# Patient Record
Sex: MALE | Race: AMERICAN INDIAN OR ALASKA NATIVE | NOT HISPANIC OR LATINO | Employment: UNEMPLOYED | URBAN - METROPOLITAN AREA
[De-identification: names, ages, dates, MRNs, and addresses within clinical notes are randomized per-mention and may not be internally consistent; named-entity substitution may affect disease eponyms.]

---

## 2021-09-09 ENCOUNTER — TRANSFERRED RECORDS (OUTPATIENT)
Dept: HEALTH INFORMATION MANAGEMENT | Facility: CLINIC | Age: 46
End: 2021-09-09

## 2021-09-09 ENCOUNTER — MEDICAL CORRESPONDENCE (OUTPATIENT)
Dept: HEALTH INFORMATION MANAGEMENT | Facility: CLINIC | Age: 46
End: 2021-09-09

## 2021-09-30 ENCOUNTER — MEDICAL CORRESPONDENCE (OUTPATIENT)
Dept: HEALTH INFORMATION MANAGEMENT | Facility: CLINIC | Age: 46
End: 2021-09-30

## 2021-09-30 ENCOUNTER — TRANSFERRED RECORDS (OUTPATIENT)
Dept: HEALTH INFORMATION MANAGEMENT | Facility: CLINIC | Age: 46
End: 2021-09-30

## 2021-10-19 ENCOUNTER — REFERRAL (OUTPATIENT)
Dept: TRANSPLANT | Facility: CLINIC | Age: 46
End: 2021-10-19

## 2021-10-19 DIAGNOSIS — K70.31 ALCOHOLIC CIRRHOSIS OF LIVER WITH ASCITES (H): ICD-10-CM

## 2021-10-19 DIAGNOSIS — K70.30 ALCOHOLIC CIRRHOSIS (H): Primary | ICD-10-CM

## 2021-10-19 NOTE — LETTER
10/21/2021    Andrei Long  [address]  [place] [address]      Dear Andrei,    Thank you for your interest in the Transplant Center at [facility]. We look forward to being a part of your care team and assisting you through the transplant process.    As we discussed, your transplant coordinator is Hanh Vences (Liver).  You may call your coordinator at any time with questions or concerns call [phone].    Please complete the following.    1. Fill out and return the enclosed forms    Authorization for Electronic Communication    Authorization to Discuss Protected Health Information    Authorization for Release of Protected Health Information    Authorization for Care Everywhere Release of Information    2. Sign up for:    [employer], access to your electronic medical record (see enclosed pamphlet)    [email or web address], a transplant education website    You can use these tools to learn more about your transplant, communicate with your care team, and track your medical details      Sincerely,  Solid Organ Transplant  [facility]    cc: Referring Physician and PCP

## 2021-10-21 VITALS — WEIGHT: 197 LBS | BODY MASS INDEX: 28.2 KG/M2 | HEIGHT: 70 IN

## 2021-10-21 ASSESSMENT — MIFFLIN-ST. JEOR: SCORE: 1784.84

## 2021-10-21 NOTE — TELEPHONE ENCOUNTER
Patient was asked the following questions during liver intake call.     Referring Provider: Kimi Georges PA-C  Referring Diagnosis: Alcoholic Cirrhosis of the Liver  PCP: Rachael Gibson PA-C    1)Do you know why you have liver disease: Yes       If Alcoholic Cirrhosis is present when was your last drink: 8/2021       Have you ever been through treatment for alcohol: Yes  2) Presence of Ascites: Yes Paracentesis: Yes  3) Presence of Hepatic Encephalopathy: Yes Medications: Yes  4) History of GI Bleeding: Yes  5) Oxygen Use: None  6) EGD: Yes Where: [facility]  When: 2021  7) Colonoscopy: No   8) MELD Score: 20  9) Labs available for review from PCP/GI: Yes  10) HCC Diagnosis: No  11)Insurance information: Medicaid      Policy leon: Self      Subscriber/policy/ID number: [id number]      Group Number:     Referral intake process completed.  Patient is aware that after financial approval is received, medical records will be requested.   Patient confirmed for a callback from transplant coordinator on 10/28/2021.  Tentative evaluation date TBD.    Confirmed coordinator will discuss evaluation process in more detail at the time of their call.   Patient is aware of the need to arrange age appropriate cancer screening, vaccinations, and dental care.  Reminded patient to complete questionnaire, complete medical records release, and review packet prior to evaluation visit .  Assessed patient for special needs (ie--wheelchair, assistance, guardian, and ):  None   Patient instructed to call [phone] with questions.     Patient gave verbal consent during intake call to obtain medical records and documents outside of [facility]:  Yes     CHIQUI Pierson, LPN       Solid Organ Transplant

## 2021-10-28 NOTE — TELEPHONE ENCOUNTER
"LIVER DISEASE ETOH  REFERRING PROVIDER [facility]  -----------------------------------------------------------------------------------------------------------------------------  MELD 20 on 21    Patient has follow up appt on  with local GI and will be getting updated labs    HISTORY OF LIVER DISEASE  First diagnosed over 8 years ago with liver disease- ETOH cirrhosis.  Wife passed away 8 years ago and he began drinking more and more heavily and symptoms progressed including nausea, muscle wasting, vomiting, and ascites;  Has had three para/thoras recently, improved over last month with sobriety period and diuretics    New mass found on CT- has appt with local GI to discuss follow up on this as well as general follow up;  Has no repeat imaging ordered as of this time;  7.8 cm lesion on CT      Ascites  Gore Furosemide- on both  Efren- per above;  Nothing in last month  TIPS no  HE not on lactulose or rifaximin, reports some fogginess  Kidney function WNL  Variceal screening 10/19/21 with banding; repeat in 3 months;  Hematemesis in  and ;     Alcohol use drank very heavily \"for many year\";  DUI's in , , , all of which required court ordered treatment, most recently inpatient treatment program      Surg hx: hernia repair in   ---------------------------------------------------------------------------------------------------------------------------------    FREDA  Lives with girlfriend, 4 kids ages 11 to 15, reports drinking worse when wife  8 years ago and very stressful; lives in [facility], not currently working  ---------------------------------------------------------------------------------------------------------------------------------  LDLT Discussed no    PLAN patient has long standing hx of alcohol use, multiple relapses, and three DUI's with 3 separate court ordered treatments- last in .  Concern for transplant candidacy, short " period of sobriety  (reports 50 days, last + ETOH was 9/9/21)   Only sober since early to mid- September;  Discussed critical importance of life-long sobriety necessary for prognosis.  Consult only with social work, hepatology, and CD eval. Will close referral at this time, order consults for CD eval, hepatology, and social work; Patient ascites improved, but also consult for potential TIPS if ascites worsens;

## 2021-11-17 ENCOUNTER — HOSPITAL ENCOUNTER (OUTPATIENT)
Dept: BEHAVIORAL HEALTH | Facility: CLINIC | Age: 46
Discharge: HOME | End: 2021-11-17
Attending: FAMILY MEDICINE | Admitting: FAMILY MEDICINE
Payer: MEDICAID

## 2021-11-17 VITALS — WEIGHT: 200 LBS | BODY MASS INDEX: 28.63 KG/M2 | HEIGHT: 70 IN

## 2021-11-17 DIAGNOSIS — K70.31 ALCOHOLIC CIRRHOSIS OF LIVER WITH ASCITES (H): ICD-10-CM

## 2021-11-17 DIAGNOSIS — K70.30 ALCOHOLIC CIRRHOSIS (H): ICD-10-CM

## 2021-11-17 PROCEDURE — H0001 ALCOHOL AND/OR DRUG ASSESS: HCPCS | Mod: GT

## 2021-11-17 RX ORDER — HYDROCODONE BITARTRATE AND ACETAMINOPHEN 10; 325 MG/1; MG/1
TABLET ORAL
COMMUNITY
Start: 2020-12-22

## 2021-11-17 RX ORDER — LORATADINE 10 MG/1
10 TABLET ORAL
COMMUNITY
Start: 2020-04-27

## 2021-11-17 RX ORDER — PANTOPRAZOLE SODIUM 40 MG/1
TABLET, DELAYED RELEASE ORAL
COMMUNITY
Start: 2021-11-01

## 2021-11-17 RX ORDER — ALBUTEROL SULFATE 90 UG/1
AEROSOL, METERED RESPIRATORY (INHALATION)
COMMUNITY
Start: 2021-06-29

## 2021-11-17 RX ORDER — SPIRONOLACTONE 50 MG/1
50 TABLET, FILM COATED ORAL DAILY
COMMUNITY
Start: 2021-09-30 | End: 2022-10-05

## 2021-11-17 RX ORDER — OXYMETAZOLINE HYDROCHLORIDE 0.05 G/100ML
1-2 SPRAY NASAL
COMMUNITY

## 2021-11-17 RX ORDER — FUROSEMIDE 20 MG
20 TABLET ORAL DAILY
COMMUNITY
Start: 2021-09-30 | End: 2022-10-05

## 2021-11-17 RX ORDER — ALBUTEROL SULFATE 90 UG/1
2 AEROSOL, METERED RESPIRATORY (INHALATION)
COMMUNITY
Start: 2021-02-12

## 2021-11-17 RX ORDER — PANTOPRAZOLE SODIUM 40 MG/1
40 TABLET, DELAYED RELEASE ORAL DAILY
COMMUNITY
Start: 2021-11-01 | End: 2021-12-31

## 2021-11-17 RX ORDER — CIPROFLOXACIN 500 MG/1
TABLET, FILM COATED ORAL
COMMUNITY
Start: 2021-09-09

## 2021-11-17 RX ORDER — ONDANSETRON 4 MG/1
TABLET, ORALLY DISINTEGRATING ORAL
COMMUNITY
Start: 2021-09-10

## 2021-11-17 ASSESSMENT — COLUMBIA-SUICIDE SEVERITY RATING SCALE - C-SSRS
4. HAVE YOU HAD THESE THOUGHTS AND HAD SOME INTENTION OF ACTING ON THEM?: NO
5. HAVE YOU STARTED TO WORK OUT OR WORKED OUT THE DETAILS OF HOW TO KILL YOURSELF? DO YOU INTEND TO CARRY OUT THIS PLAN?: NO
TOTAL  NUMBER OF ABORTED OR SELF INTERRUPTED ATTEMPTS PAST LIFETIME: NO
6. HAVE YOU EVER DONE ANYTHING, STARTED TO DO ANYTHING, OR PREPARED TO DO ANYTHING TO END YOUR LIFE?: NO
4. HAVE YOU HAD THESE THOUGHTS AND HAD SOME INTENTION OF ACTING ON THEM?: NO
1. IN THE PAST MONTH, HAVE YOU WISHED YOU WERE DEAD OR WISHED YOU COULD GO TO SLEEP AND NOT WAKE UP?: NO
TOTAL  NUMBER OF ACTUAL ATTEMPTS LIFETIME: 0
2. HAVE YOU ACTUALLY HAD ANY THOUGHTS OF KILLING YOURSELF LIFETIME?: NO
5. HAVE YOU STARTED TO WORK OUT OR WORKED OUT THE DETAILS OF HOW TO KILL YOURSELF? DO YOU INTEND TO CARRY OUT THIS PLAN?: NO
TOTAL  NUMBER OF ABORTED OR SELF INTERRUPTED ATTEMPTS PAST 3 MONTHS: NO
6. HAVE YOU EVER DONE ANYTHING, STARTED TO DO ANYTHING, OR PREPARED TO DO ANYTHING TO END YOUR LIFE?: NO
1. IN THE PAST MONTH, HAVE YOU WISHED YOU WERE DEAD OR WISHED YOU COULD GO TO SLEEP AND NOT WAKE UP?: NO
2. HAVE YOU ACTUALLY HAD ANY THOUGHTS OF KILLING YOURSELF?: NO
ATTEMPT LIFETIME: NO
3. HAVE YOU BEEN THINKING ABOUT HOW YOU MIGHT KILL YOURSELF?: NO
TOTAL  NUMBER OF INTERRUPTED ATTEMPTS PAST 3 MONTHS: NO
TOTAL  NUMBER OF INTERRUPTED ATTEMPTS LIFETIME: NO
ATTEMPT PAST THREE MONTHS: NO

## 2021-11-17 ASSESSMENT — PAIN SCALES - GENERAL: PAINLEVEL: SEVERE PAIN (6)

## 2021-11-17 ASSESSMENT — ANXIETY QUESTIONNAIRES
6. BECOMING EASILY ANNOYED OR IRRITABLE: MORE THAN HALF THE DAYS
2. NOT BEING ABLE TO STOP OR CONTROL WORRYING: NEARLY EVERY DAY
7. FEELING AFRAID AS IF SOMETHING AWFUL MIGHT HAPPEN: SEVERAL DAYS
GAD7 TOTAL SCORE: 16
5. BEING SO RESTLESS THAT IT IS HARD TO SIT STILL: MORE THAN HALF THE DAYS
1. FEELING NERVOUS, ANXIOUS, OR ON EDGE: MORE THAN HALF THE DAYS
4. TROUBLE RELAXING: NEARLY EVERY DAY
3. WORRYING TOO MUCH ABOUT DIFFERENT THINGS: NEARLY EVERY DAY

## 2021-11-17 ASSESSMENT — MIFFLIN-ST. JEOR: SCORE: 1798.44

## 2021-11-17 NOTE — PROGRESS NOTES
"[facility]  Provider Name:  Hermelindo Lm     Credentials:  [facility]    PATIENT'S NAME: Andrei Long  PREFERRED NAME: \"Andrei\"  PRONOUNS:  he/him/his    MRN: [id number]  : 1975  ADDRESS: [address] [place] [address]  ACCT. NUMBER:  [id number]  DATE OF SERVICE: 21  START TIME: 8:00 AM  END TIME: 9:51 AM  PREFERRED PHONE: [phone]  May we leave a program related message: Yes  SERVICE MODALITY:  Video Visit:      Provider verified identity through the following two step process.  Patient provided:  Patient  and Patient address    Telemedicine Visit: The patient's condition can be safely assessed and treated via synchronous audio and visual telemedicine encounter.      Reason for Telemedicine Visit: Patient has requested telehealth visit    Originating Site (Patient Location): Patient's home    Distant Site (Provider Location): Provider Remote Setting- Home Office    Consent:  The patient/guardian has verbally consented to: the potential risks and benefits of telemedicine (video visit) versus in person care; bill my insurance or make self-payment for services provided; and responsibility for payment of non-covered services.     Patient would like the video invitation sent by:  Text to cell phone: [phone]    Mode of Communication:  Video Conference via Amwell    As the provider I attest to compliance with applicable laws and regulations related to telemedicine.    UNIVERSAL ADULT Substance Use Disorder DIAGNOSTIC ASSESSMENT    Identifying Information:  Patient is a 46 year old, Indigenous/ .  The pronoun use throughout this assessment reflects the patient's chosen pronoun.  Patient was referred for an assessment by Kimi Agosto .  Patient attended the session with his fiance.    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 First diagnosed over 8 years ago with liver disease- ETOH cirrhosis.  Wife passed away 8 years ago and he began drinking more and more heavily and symptoms progressed including nausea, muscle wasting, vomiting, and ascites. Patient has attempted to resolve these concerns in the past through IP once and IOP/OP 5 times.  Patient does not appear to be in severe withdrawal, an imminent safety risk to self or others, or requiring immediate medical attention and may proceed with the assessment interview.    Social/Family History:  Patient reported that he grew up in [place]\".  He was raised by \"both biological parents\". Patient reported that his childhood was \"happy\".  Patient describes current relationships with family of origin as \"good\".      The patient describes his cultural background as -Indigenous/.  Cultural influences and impact on patient's life structure, values, norms, and healthcare: \"not lying\".  Contextual influences on patient's health include: Individual Factors \"patient's ongoing problem with alcohol\" and Family Factors \"grand father and mother from his dad's side were alcoholics, father  was an alcoholic\".  Patient identified his preferred language to be English. Patient reported that he do not need the assistance of an  or other support involved in therapy.  Patient reports that he is not involved in community of cammy activities.  He reports spirituality impacts recovery in the following ways: \"God will hopefully keep me sober\".     Patient reported had no significant delays in developmental tasks.   Patient's highest education level was some high school but no degree. Patient denies any learning problems.  Patient reports that he is  able to understand written materials.    Patient reported the following relationship history \" with wife for 6 years and together for 8 years.  " "Patient's current relationship status is in a relationship with his hilario for 5 and half years.   Patient identified his sexual orientation as heterosexual.  Patient reported having four children from age 11-15.     Patient's current living/housing situation involves staying in own home/apartment. He lives with girlfriend, 4 kids ages 11 to 15 and reports that housing is stable. Patient identified partner, mother and friends as part of his support system.  Patient identified the quality of these relationships as good.      Patient reports engaging in the following recreational/leisure activities: \"outdoors, fishing, camping, hunting\". Patient reports engaging in the following recreation/leisure activities while using: \"bars, shoot pools\". Patient reports the following people are supportive of recovery: \"mother, hilario and a couple friends\".  Patient is currently unemployed.  Patient reports that his income is obtained through [employer] disability, general assistance and [employer] assistance.  Patient does identify finances as a current stressor.      Patient reports the following substance related arrests or legal issues: DUI's in 1998, 2000, 2012.  Patient denies being on probation / parole / under the jurisdiction of the court.    Patient's Strengths and Limitations:  Patient identified the following strengths or resources that will help them succeed in treatment: commitment to health and well being, community involvement, cammy / spirituality, friends / good social support, family support, insight, motivation and sober support group / recovery support . Things that may interfere with the patient's success in treatment include: few friends, financial hardship, lack of family support, lack of social support and physical health concerns.     Personal and Family Medical History:  Patient did report a family history of mental health concerns.  Patient reports family history includes Substance Abuse in his father, paternal " "grandfather, and paternal grandmother..      Patient reported the following previous mental health diagnoses: \"PTSD\".  Patient reports that his primary mental health symptoms include: \"anxiety, panic attack, not wanting to leave the house\" and these do not impact his ability to function.   Patient has received mental health services in the past: \"talked a therapist once after my wife passed away\".  Psychiatric Hospitalizations: None.  Patient denies a history of civil commitment.  Current mental health services/providers include:  None reported.    GAIN-SS Tool:    When was the last time that you had significant problems... 11/17/2021   with feeling very trapped, lonely, sad, blue, depressed or hopeless about the future? Past month   with sleep trouble, such as bad dreams, sleeping restlessly, or falling asleep during the day? Past Month   with feeling very anxious, nervous, tense, scared, panicked or like something bad was going to happen? Past month   with becoming very distressed & upset when something reminded you of the past? Past month   with thinking about ending your life or committing suicide? Never     When was the last time that you did the following things 2 or more times? 11/17/2021   Lied or conned to get things you wanted or to avoid having to do something? Never   Had a hard time paying attention at school, work or home? Past month   Had a hard time listening to instructions at school, work or home? Past month   Were a bully or threatened other people? 1+ years ago   Started physical fights with other people? 1+ years ago       Patient has had a physical exam to rule out medical causes for current symptoms.  Date of last physical exam was within the past year. Symptoms have developed since last physical exam and client was encouraged to follow up with PCP.  . The patient has a non-[place] Primary Care Provider. Their PCP is Dr. Rachael PRITCHARD from [facility]..  Patient reports the following current " "medical concerns: \"Alcoholic Cirrhosis of the Liver and no current dental concerns.  Patient reports pain concerns including \"belly button and to knees\".  Patient does want help addressing pain concerns.. Patient denies pregnancy..  There are not significant appetite / nutritional concerns / weight changes.  Patient does not report a history of an eating disorder.  Patient does not report a history of head injury / trauma / cognitive impairment.  None reported.    Patient reports current meds as:   Outpatient Medications Marked as Taking for the 11/17/21 encounter (Hospital Encounter) with Hermelindo Amato LADC   Medication Sig     albuterol (PROAIR HFA/PROVENTIL HFA/VENTOLIN HFA) 108 (90 Base) MCG/ACT inhaler Inhale 2 puffs into the lungs     albuterol (PROAIR HFA/PROVENTIL HFA/VENTOLIN HFA) 108 (90 Base) MCG/ACT inhaler INHALE 2 PUFFS BY MOUTH EVERY 4-6 HOURS AS NEEDED FOR SHORTNESS OF BREATH OR COUGH FOR UP TO 5 DAYS SHAKE WELL BEFORE USING.     ciprofloxacin (CIPRO) 500 MG tablet TAKE 1 TABLET (500 MG) BY MOUTH 2 TIMES A DAY FOR 7 DAYS     furosemide (LASIX) 20 MG tablet Take 20 mg by mouth     loratadine (CLARITIN) 10 MG tablet Take 10 mg by mouth     ondansetron (ZOFRAN-ODT) 4 MG ODT tab TAKE 1 TABLET (4 MG) BY MOUTH EVERY 4 HOURS AS NEEDED FOR NAUSEA OR VOMITING     Oxymetazoline HCl (NASAL SPRAY) 0.05 % SOLN Spray 1-2 sprays in nostril     pantoprazole (PROTONIX) 40 MG EC tablet Take 40 mg by mouth     pantoprazole (PROTONIX) 40 MG EC tablet TAKE 1 TABLET (40 MG) BY MOUTH 1 TIME A DAY IN THE MORNING     spironolactone (ALDACTONE) 50 MG tablet Take 50 mg by mouth       Medication Adherence:  Patient reports taking prescribed medications as prescribed.    Patient Allergies:  No Known Allergies    Medical History:  History reviewed. No pertinent past medical history.    Rating Scales:    PHQ9:    PHQ-9 SCORE 11/17/2021   PHQ-9 Total Score 19   ;      GAD7:    DESTIN-7 SCORE 11/17/2021   Total Score 16 " "      Substance Use:  Patient reported the following biological family members or relatives with chemical health issues:  \"grand father and mother from his dad's side were alcoholics, father  was an alcoholic..  Patient has received substance use disorder and/or gambling treatment in the past.  Patient reports the following dates and locations of treatment services:  \"IP once and IOP/OP 5 times\".  Patient has been to detox once.  Patient is not currently receiving any chemical dependency treatment. Patient reports that he has attended support groups such as AA in the past.        Substance Age of first use Pattern and duration of use (include amounts and frequency) Date of last use     Withdrawal potential Route of administration   has used Alcohol 17 Drinking once in a while in HS, when  Older drinking 2 beers and probably 6-8 drinks on weekends.  Alcohol use drank very heavily \"for many year ; [place] in , , , all of which required court ordered treatment, most recently inpatient treatment program.Reports drinking worse (a liter daily) when wife  8 years ago for 6 months straight , got in trouble, went to prison, then treatment and began drinking again.   22 No oral   has used Marijuana   46 Started smoking 2 months since he cannot get pain pills. Reports that he is in the process of getting a medical marijuana card. 22 Yes smoked     has not used Amphetamines   NA NA NA NA  NA   has not used Cocaine/crack    NA NA NA NA  NA   has not used Hallucinogens NA NA NA NA  NA   has not used Inhalants NA NA NA NA  NA   has not used Heroin NA NA NA NA  NA   has not used Other Opiates NA NA NA NA  NA   has not used Benzodiazepine   NA NA NA NA  NA   has not used Barbiturates NA NA NA NA  NA   has not used Over the counter meds. NA NA NA NA  NA   has not use Caffeine NA NA NA NA  NA   has used Nicotine  20 Smoked about a pack daily 22 Yes smoked   has not used other substances not listed " "above:  Identify:  NA NA NA NA  NA       Patient reported the following problems as a result of his substance use: DUI, legal issues, relationship problems and health issues.  Patient is concerned at first once given his medical diagnosis but right now reports that he has no desire to drink.  Patient reports that his recovery goals are \"to continue to abstain from drinking because he wants to live\".     Patient reports experiencing the following withdrawal symptoms within the past 12 months: sweating, shaky/jittery/tremors, unable to sleep, agitation, headache, fatigue, sad/depressed feeling, muscle aches, vivid/unpleasant dreams, irritability, nausea/vomiting, dizziness, diarrhea, diminished appetite, unable to eat and anxiety/worry and the following within the past 30 days: none.   Patients reports no urges to use Alcohol.  Patient reports he has used more Alcohol than intended and over a longer period of time than intended. Patient reports he has had unsuccessful attempts to cut down or control use of Alcohol.  Patient reports longest period of abstinence was \"4 years\" and return to use was due to \"getting pissed off, and worked up; anxiety\". Patient reports he has needed to use more Alcohol to achieve the same effect.  Patient does not report diminished effect with use of same amount of Alcohol.     Patient does  report a great deal of time is spent in activities necessary to obtain, use, or recover from Alcohol effects.  Patient does not report important social, occupational, or recreational activities are given up or reduced because of Alcohol use.  Alcohol use is continued despite knowledge of having a persistent or recurrent physical or psychological problem that is likely to have caused or exacerbated by use.  Patient reports the following problem behaviors while under the influence of substances \"happy laughing type of drunk, arguments and fights at the bars when younger\".     Patient reports substance use " "has not ever impacted his ability to function in a school setting. Patient reports substance use has not ever impacted his ability to function in a work setting.  Patients demographics and history impact his recovery in the following ways:  \"grand father and mother from his dad's side were alcoholics, father  was an alcoholic\".  Patient reports engaging in the following recreation/leisure activities while using:  \"bars and shoot pools\".  Patient reports the following people are supportive of recovery: \"fiance, 2 friends and mother\"    Patient does not have a history of gambling concerns and/or treatment.  Patient does not have other addictive behaviors he is concerned about.        Dimension Scale Ratings:    Dimension 1 -  Acute Intoxication/Withdrawal: 0 - No Problem Patient reports that his drug of choice is alcohol with inability to abstain from drinking on his own. His last use of was on 09/8/21. He denies any withdrawals symptoms at this time.  Dimension 2 - Biomedical: 2 - Moderate Problem Patient presents with medical conditions that requires ongoing medical care. Pt has a primary care provider and is able to access medical care as needed.   Dimension 3 - Emotional/Behavioral/Cognitive Conditions: 2 - Moderate Problem Patient reports mental health diagnosis of PTSD, grief/loss. Pt reports difficulty with impulse control and lack of coping skills. He denies any suicidal thoughts or plan or current intent to self-harm.  Dimension 4 - Readiness to Change:  2 - Moderate Problem Patient continues to drink despite negative consequences. Pt presents as in the contemplative stage of change.  She admits his use as problematic to self, and needs professional help to achieve long term sobriety.   Dimension 5 - Relapse/Continued Use/ Continued Problem Potential: 3 - Severe Problem Patient reports multiple prior treatment episodes and limited period of sobriety. Patient lacks adequate insight into the disease of " addiction and understanding of the CD concepts and how to apply them to self.  Dimension 6 - Recovery Environment:  3 - Severe Problem Patient lives with his fiancé and his 4 children. Pt is unemployed and is not engaged in any structured activities, lacks sober support and needs to build a support network. Pt denies any current legal issues at this time.    Significant Losses / Trauma / Abuse / Neglect Issues:   Patient reports that he did not serve in the .  There are indications or report of significant loss, trauma, abuse or neglect issues related to: death of father, wife and grandpa close together.  Concerns for possible neglect are not present. NA    Safety Assessment:   Current Safety Concerns:  [place] Suicide Severity Rating Scale (Lifetime/Recent)  [place] Suicide Severity Rating (Lifetime/Recent) 11/17/2021   1. Wish to be Dead (Lifetime) No   1. Wish to be Dead (Recent) No   2. Non-Specific Active Suicidal Thoughts (Lifetime) No   2. Non-Specific Active Suicidal Thoughts (Recent) No   3. Active Suicidal Ideation with any Methods (Not Plan) Without Intent to Act (Lifetime) No   3. Active Suicidal Ideation with any Methods (Not Plan) Without Intent to Act (Recent) No   4. Active Suicidal Ideation with Some Intent to Act, Without Specific Plan (Lifetime) No   4. Active Suicidal Ideation with Some Intent to Act, Without Specific Plan (Recent) No   5. Active Suicidal Ideation with Specific Plan and Intent (Lifetime) No   5. Active Suicidal Ideation with Specific Plan and Intent (Recent) No   Most Severe Ideation Rating (Lifetime) NA   Most Severe Ideation Description (Lifetime) NA   Frequency (Lifetime) NA   Duration (Lifetime) NA   Controllability (Lifetime) NA   Protective Factors  (Lifetime) NA   Reasons for Ideation (Lifetime) NA   Most Severe Ideation Rating (Past Month) NA   Most Severe Ideation Description (Past Month) NA   Frequency (Past Month) NA   Duration (Past Month) NA    Controllability (Past Month) NA   Protective Factors (Past Month) NA   Reasons for Ideation (Past Month) NA   Actual Attempt (Lifetime) No   Actual Attempt Description (Lifetime) NA   Total Number of Actual Attempts (Lifetime) 0   Actual Attempt (Past 3 Months) No   Has subject engaged in non-suicidal self-injurious behavior? (Lifetime) No   Has subject engaged in non-suicidal self-injurious behavior? (Past 3 Months) No   Interrupted Attempts (Lifetime) No   Interrupted Attempts (Past 3 Months) No   Aborted or Self-Interrupted Attempt (Lifetime) No   Aborted or Self-Interrupted Attempt (Past 3 Months) No   Preparatory Acts or Behavior (Lifetime) No   Preparatory Acts or Behavior (Past 3 Months) No   Most Recent Attempt Actual Lethality Code NA   Most Lethal Attempt Actual Lethality Code NA   Initial/First Attempt Actual Lethality Code NA     Patient denies current homicidal ideation and behaviors.  Patient denies current self-injurious ideation and behaviors.    Patient reported unsafe motor vehicle operation associated with substance use.  Patient denies any high risk behaviors associated with mental health symptoms.  Patient reports the following current concerns for his personal safety: None.  Patient reports there are no firearms in the house.       History of Safety Concerns:  Patient denied a history of homicidal ideation.     Patient denied a history of personal safety concerns.    Patient denied a history of assaultive behaviors.    Patient denied a history of sexual assault behaviors.     Patient reported a history unsafe motor vehicle operation associated with substance use.  Patient reported a history of substance use associated with mental health symptoms.  Patient reports the following protective factors:      Risk Plan:  See Recommendations for Safety and Risk Management Plan    Review of Symptoms per patient report:  Substance Use:  blackouts, passing out, vomiting and other substance related  "medical issue \"Alcoholic Cirrhosis of the Liver\"     Diagnostic Criteria:  Substance Use Disorder Substance is often taken in larger amounts or over a longer period than was intended.  Met for:  Alcohol There is persistent desire or unsuccessful efforts to cut down or control use of the substance.  Met for:  Alcohol  A great deal of time is spent in activities necessary to obtain the substance, use the substance, or recover from its effects.  Met for:  Alcohol Recurrent use of the substance resulting in a failure to fulfill major role obligations at work, school, or home.  Met for:  Alcohol Continued use of the substance despite having persistent or recurrent social or interpersonal problems caused or exacerbated by the effects of its use.  Met for:  Alcohol Recurrent use of the substance in which it is physically hazardous.  Met for:  Alcohol Use of the substance is continued despite knowledge of having a persistent or recurrent physical or psychological problem that is likely to have been cause or exacerbated by the substance.  Met for:  Alcohol Tolerance:  either a need for markedly increased amounts of the substance to achieve the desired effect or a markedly diminished effect with continued use of the dame amount of the substance.  Met for:  Alcohol and Cannabis Withdrawal:  either patient endorses characteristic withdrawal syndrome for the substance or the substance (or closely related substance) is taken to relieve or avoid withdrawal symptoms.  Met for:  Alcohol          Collateral Contact Summary:   Collateral contacts contributing to this assessment:  Yes  Collateral Contacts     Name:    Ector Jack   Relationship:    friend   Phone Number:    [phone] Releases:    Yes     Writer spoke to Ector who reported that he has known patient his whole and that his drinking was not bad from the beginning. And that it was not until his wife passed and was under a lot stress his drinking ramped up significantly. He " reports that he has told him several time to stop drinking and finally listened to him. He reports that patient a lot of support from family and friends. He reports constant contact with patient, getting update on his health and his feeling about being sober from alcohol for the past couple months. He reports that patient is excited about a liver transplant because he wants to be there for his children.       Collateral Contacts     Name:    Alexandro Munoz    Relationship:    friend   Phone Number:    [phone]   Releases:    Yes     Writer spoke to Alexandro who stated that he has known Andrei for about 40 years and that his drinking got out of control 9-10 years ago when he lost his grand father, his father and his wife back to back. He reports that he has not seen him in 4-5 months and heard that he has not drank for 2-3 months now. He reports that he used to drink on weekends with him but stopped because he did not want to be around it anymore or watch him destroy himselfe. He reports that patient is in rough shape, turned green and hope he does it for his children. He states that he told patient that a liver transplant require a drastic change of lifetsyle.      If court related records were reviewed, summarize here: NA    Information from collateral contacts supported/largely agreed with information from the client and associated risk ratings.    Information in this assessment was obtained from the medical record and provided by patient and family who is a fair historian.    Patient will have open access to their mental health medical record.    Diagnostic Criteria: 1.) Alcohol/drug is often taken in larger amounts or over a longer period than was intended.  Met for Alcohol.  2.) There is a persistent desire or unsuccessful efforts to cut down or control alcohol/drug use.  Met for Alcohol.  3.) A great deal of time is spent in activities necessary to obtain alcohol, use alcohol, or recover from its effects.  Met  for Alcohol.  5.) Recurrent alcohol/drug use resulting in a failure to fulfill major role obligations at work, school or home.  Met for Alcohol.  6.) Continued alcohol use despite having persistent or recurrent social or interpersonal problems caused or exacerbated by the effects of alcohol/drug.  Met for Alcohol.  7.) Important social, occupational, or recreational activities are given up or reduced because of alcohol/drug use.  Met for Alcohol.  8.) Recurrent alcohol/drug use in situations in which it is physically hazardous.  Met for Alcohol.  9.) Alcohol/drug use is continued despite knowledge of having a persistent or recurrent physical or psychological problem that is likely to have been caused or exacerbated by alcohol.  Met for Alcohol.  10.) Tolerance, as defined by either of the following: A need for markedly increased amounts of alcohol/drug to achieve intoxication or desired effect..  Met for Alcohol and Cannabis.  11.) Withdrawal, as manifested by either of the following: The characteristic withdrawal syndrome for alcohol/drug (refer to Criteria A and B of the criteria set for alcohol/drug withdrawal).. Met for Alcohol.       As evidenced by self report and criteria, client meets the following DSM5 Diagnoses:   (Sustained by DSM5 Criteria Listed Above)  Alcohol Use Disorder   303.90 (F10.20) Severe Sustained  305.20 (F12.10) Cannabis Use Disorder Mild  Sustained  Specify if: In a controlled environment, Specify current severity:  305.1 (F17.200) Severe.    Recommendations:     1. Plan for Safety and Risk Management:  Recommended that patient call 911 or go to the local ED should there be a change in any of these risk factors..      Report to child / adult protection services was NA.     2. ANAIS Referrals:   Recommendations:    1)  Complete a IOP/OP at [facility].   2)  Abstain from all mood-altering chemicals unless prescribed by a licensed provider.   3)  Attend weekly  12-step support group meetings.     4)  Follow all the recommendations of your treatment/medical providers.          Patient reports that he is willing to follow these recommendations.  Patient would like the following family or other support people involved in his treatment:  none. Patient does not have a history of opiate use.    3. Daanes:  Record has been saved! Assessment ID: [id number]     4. Recommendations for treatment focus:   Alcohol / Substance Use - IOP/OP.        Referrals:   [facility]  [address] [place] [id number]  Main Tel: [phone]  Intake Tel: [phone]  Fax:[phone]              Provider Name/ Credentials:  Hermelindo Amato MA, [facility]  Substance Use Assessment  Phone: [phone]  Fax: [phone]    November 17, 2021

## 2021-11-18 ASSESSMENT — ANXIETY QUESTIONNAIRES: GAD7 TOTAL SCORE: 16

## 2021-11-18 ASSESSMENT — PATIENT HEALTH QUESTIONNAIRE - PHQ9: SUM OF ALL RESPONSES TO PHQ QUESTIONS 1-9: 19

## 2021-11-18 NOTE — ADDENDUM NOTE
Encounter addended by: [facility] on: 11/18/2021 9:49 AM   Actions taken: Clinical Note Signed

## 2021-11-30 NOTE — TELEPHONE ENCOUNTER
RECORDS RECEIVED FROM: Internal   Appt Date: 12.01.2021   NOTES STATUS DETAILS   OFFICE NOTE from referring provider Internal 10.19.2021 Dario Hendricks MD   OFFICE NOTES from other specialists Care Everywhere 11.01.2021 Kimi Georges, APRN-CNP    09.13.2021 Rachael Gibson, NARAYAN    11.11.2020 Naveen Vazquez MD      07.09.2020 Kimi Lainez, APRN-CNP     DISCHARGE SUMMARY from hospital Care Everywhere 09.09.2021 [facility]     MEDICATION LIST Internal    LIVER BIOSPY (IF APPLICABLE)      PATHOLOGY REPORTS  N/A    IMAGING     ENDOSCOPY (IF AVAILABLE) N/A    COLONOSCOPY (IF AVAILABLE) N/A    ULTRASOUND LIVER N/A    CT OF ABDOMEN N/A    MRI OF LIVER N/A    FIBROSCAN, US ELASTOGRAPHY, FIBROSIS SCAN, MR ELASTOGRAPHY N/A    LABS     HEPATIC PANEL (LIVER PANEL) N/A    BASIC METABOLIC PANEL Care Everywhere 07.09.2018   COMPLETE METABOLIC PANEL N/A    COMPLETE BLOOD COUNT (CBC) N/A    INTERNATIONAL NORMALIZED RATIO (INR) Care Everywhere 11.01.2021   HEPATITIS C ANTIBODY Care Everywhere 06.25.2020   HEPATITIS C VIRAL LOAD/PCR N/A    HEPATITIS C GENOTYPE N/A    HEPATITIS B SURFACE ANTIGEN Care Everywhere 06.25.2020   HEPATITIS B SURFACE ANTIBODY N/A    HEPATITIS B DNA QUANT LEVEL N/A    HEPATITIS B CORE ANTIBODY N/A

## 2021-12-01 ENCOUNTER — PRE VISIT (OUTPATIENT)
Dept: GASTROENTEROLOGY | Facility: CLINIC | Age: 46
End: 2021-12-01
Payer: MEDICAID

## 2021-12-01 ENCOUNTER — VIRTUAL VISIT (OUTPATIENT)
Dept: GASTROENTEROLOGY | Facility: CLINIC | Age: 46
End: 2021-12-01
Attending: INTERNAL MEDICINE
Payer: MEDICAID

## 2021-12-01 DIAGNOSIS — K70.31 ALCOHOLIC CIRRHOSIS OF LIVER WITH ASCITES (H): ICD-10-CM

## 2021-12-01 PROCEDURE — 99204 OFFICE O/P NEW MOD 45 MIN: CPT | Mod: 95 | Performed by: INTERNAL MEDICINE

## 2021-12-01 PROCEDURE — G0463 HOSPITAL OUTPT CLINIC VISIT: HCPCS | Mod: PN,RTG | Performed by: INTERNAL MEDICINE

## 2021-12-01 ASSESSMENT — PAIN SCALES - GENERAL: PAINLEVEL: MODERATE PAIN (4)

## 2021-12-01 NOTE — LETTER
12/1/2021     RE: Andrei Long  [address]  [facility] [address]    Dear Colleague,    Thank you for referring your patient, Andrei Long, to the [facility] [place]. Please see a copy of my visit note below.    Send text to [phone]    Andrei is a 46 year old who is being evaluated via a billable video visit.      How would you like to obtain your AVS? Mail a copy  If the video visit is dropped, the invitation should be resent by: Text to cell phone: [phone]  Will anyone else be joining your video visit? No      Video Start Time: 8:57 AM.    [facility] Hepatology    New Patient Visit     CHIEF COMPLAINT AND REASON FOR VISIT:  Alcoholic liver disease.    CONSULTING HEALTHCARE PROVIDER:  Stanley Amato MD, [place], [place].    HISTORY OF PRESENT ILLNESS:  Mr. Long is a 46-year-old male who has a history of alcohol abuse and alcoholic liver disease.  He has progressed to cirrhosis.  He had this complicated by both esophageal varices and significant fluid retention.  He had continued also to drink alcohol, although he was diagnosed with liver disease about 3 years ago when he developed abdominal distention.  His last alcohol use was 09/08/2021, according to him and his significant other.      He last  paracentesis 4 weeks ago and his  thoracentesis 3 weeks ago He has some confusion and memory issues.  He has sleep cycle disturbances also.  He is not currently on lactulose and he had 1 bowel movement with no blood in it.  Also on 10/08/2020, Mr. Long did present for an upper endoscopy.  In fact, this was done on 09/21/2021 and he was found to have esophageal varices.  These were banded.  His CT scans also showed that he has hepatosplenomegaly and they thought that this could be related with his alcoholic liver disease/hepatitis.  Not only does he have the fluid in his abdomen, but he has also right hepatic hydrothorax.     He does not have currently any  abdominal pain, nausea or vomiting.  He is moving his bowels on a daily basis and above all, he had lost a lot of muscle mass and has gone down weight-wise from 230 maximum to 198 now, and he is 5 feet 10 inches.  As far as the COVID vaccination is concerned, he did get the Moderna x2.    Medical hx Surgical hx   Past Medical History:   Diagnosis Date     Alcohol abuse      Chronic alcoholic liver disease (H)       No past surgical history on file.       Medications  Current Outpatient Medications   Medication Sig Dispense Refill     albuterol (PROAIR HFA/PROVENTIL HFA/VENTOLIN HFA) 108 (90 Base) MCG/ACT inhaler INHALE 2 PUFFS BY MOUTH EVERY 4-6 HOURS AS NEEDED FOR SHORTNESS OF BREATH OR COUGH FOR UP TO 5 DAYS SHAKE WELL BEFORE USING.       furosemide (LASIX) 20 MG tablet Take 20 mg by mouth daily        loratadine (CLARITIN) 10 MG tablet Take 10 mg by mouth       ondansetron (ZOFRAN-ODT) 4 MG ODT tab TAKE 1 TABLET (4 MG) BY MOUTH EVERY 4 HOURS AS NEEDED FOR NAUSEA OR VOMITING       pantoprazole (PROTONIX) 40 MG EC tablet Take 40 mg by mouth daily        spironolactone (ALDACTONE) 50 MG tablet Take 50 mg by mouth daily        albuterol (PROAIR HFA/PROVENTIL HFA/VENTOLIN HFA) 108 (90 Base) MCG/ACT inhaler Inhale 2 puffs into the lungs (Patient not taking: Reported on 12/1/2021)       ciprofloxacin (CIPRO) 500 MG tablet TAKE 1 TABLET (500 MG) BY MOUTH 2 TIMES A DAY FOR 7 DAYS (Patient not taking: Reported on 12/1/2021)       HYDROcodone-acetaminophen (NORCO)  MG per tablet TAKE 1 TABLET BY MOUTH 2 TIMES A DAY AS NEEDED FOR MODERATE PAIN FOR UP TO 7 DAYS (Patient not taking: Reported on 11/17/2021)       Oxymetazoline HCl (NASAL SPRAY) 0.05 % SOLN Spray 1-2 sprays in nostril (Patient not taking: Reported on 12/1/2021)       pantoprazole (PROTONIX) 40 MG EC tablet TAKE 1 TABLET (40 MG) BY MOUTH 1 TIME A DAY IN THE MORNING (Patient not taking: Reported on 12/1/2021)       Allergies  No Known Allergies    Family hx  Social hx   Family History   Problem Relation Age of Onset     Substance Abuse Father      Substance Abuse Paternal Grandmother      Substance Abuse Paternal Grandfather       Social History     Tobacco Use     Smoking status: Current Every Day Smoker     Packs/day: 0.75     Years: 20.00     Pack years: 15.00     Types: Cigarettes     Smokeless tobacco: Never Used   Substance Use Topics     Alcohol use: Not Currently     Comment: Last ETOH 8/2021     Drug use: Not Currently     Types: Marijuana     Comment: Quit, Smokes marijuana daily        Review of systems  He denies any infections recently.  He has fatigue.  Has occasional headaches.  Has although he had withdrawals from alcohol, he never had seizures.  He has cough as the patient is a smoker, does not have any significant shortness of breath or dyspnea on exertion.  He denies any chest pain also.  He has no known anemia or easy bruising.  He has right lung fluid with shortness of breath related with that.  He is not known to have diabetes or thyroid disease.  He is not known to have any degenerative joint disease.  Psychiatric-wise, he is known to have been on alcohol for quite a lot of time and he attributes that to PTSD and when his wife passed away.  He has also no hearing or skin issues.      Examination  There were no vitals taken for this visit.  There is no height or weight on file to calculate BMI.    Gen- well, NAD, A+Ox3, normal color  Psych- normal mood    Laboratory    Radiology    ASSESSMENT AND PLAN:  Alcoholic liver disease.  Mr. Long has alcoholic liver disease.  He has hepatomegaly, which means that he has a component of fatty infiltration of the liver and inflammation.  There is a chance that if he abstains, he will improve.  He has a low MELD score of around 17, and he had discontinued alcohol 09/08/2021.  This makes him ineligible for transplantation evaluation, in which the patient was interested.  We did explain to him that he will  need, above all, 6 months sobriety.  He will need to have a followup and clearance from Chemical Dependency or a psychiatrist.  He has failed like 4 treatments in the past and has done court sanctioned inpatient treatment, where he stayed 40 days in an inpatient treatment center.  He has also multiple DUIs.  Considering all that, that will be the first step and if his MELD score does not drop and he becomes eligible because of that, in 6 months then he will be reevaluated.  I will see him here, repeat his MELD score and see if he has worsened.      Otherwise, he will need imaging every 6 months to do surveillance for HCC and he will need to have his EGD done locally.  His last one done there showed grade 3 esophageal varices with stigmata of recent bleed, and this was banded and completely eradicated.  For all his other medical issues, he will follow up with his primary care physician and he will be seen here in 3 months.    This was a 45-minute visit, of which more than 50% was spent in explaining to the patient what our plan of care was.  We answered all his questions and that of his significant other.        Dario Hendricks MD  Hepatology  [facility] [place] [facility]    cc:  Stanley Amato MD  [facility]  [address] [place] [address]      Video-Visit Details  Type of service:  Video Visit  Video End Time:9:33 Am.  Originating Location (pt. Location): Home  Distant Location (provider location):  [facility]PAT[facility] [place]   Platform used for Video Visit: Well    Again, thank you for allowing me to participate in the care of your patient.      Sincerely,    Dario Hendricks MD

## 2021-12-01 NOTE — PROGRESS NOTES
Send text to [phone]    Andrei is a 46 year old who is being evaluated via a billable video visit.      How would you like to obtain your AVS? Mail a copy  If the video visit is dropped, the invitation should be resent by: Text to cell phone: [phone]  Will anyone else be joining your video visit? No      Video Start Time: 8:57 AM.    [facility] Hepatology    New Patient Visit     CHIEF COMPLAINT AND REASON FOR VISIT:  Alcoholic liver disease.    CONSULTING HEALTHCARE PROVIDER:  Stanley Amato MD, [place], [place].    HISTORY OF PRESENT ILLNESS:  Mr. Long is a 46-year-old male who has a history of alcohol abuse and alcoholic liver disease.  He has progressed to cirrhosis.  He had this complicated by both esophageal varices and significant fluid retention.  He had continued also to drink alcohol, although he was diagnosed with liver disease about 3 years ago when he developed abdominal distention.  His last alcohol use was 09/08/2021, according to him and his significant other.      He last  paracentesis 4 weeks ago and his  thoracentesis 3 weeks ago He has some confusion and memory issues.  He has sleep cycle disturbances also.  He is not currently on lactulose and he had 1 bowel movement with no blood in it.  Also on 10/08/2020, Mr. Long did present for an upper endoscopy.  In fact, this was done on 09/21/2021 and he was found to have esophageal varices.  These were banded.  His CT scans also showed that he has hepatosplenomegaly and they thought that this could be related with his alcoholic liver disease/hepatitis.  Not only does he have the fluid in his abdomen, but he has also right hepatic hydrothorax.     He does not have currently any abdominal pain, nausea or vomiting.  He is moving his bowels on a daily basis and above all, he had lost a lot of muscle mass and has gone down weight-wise from 230 maximum to 198 now, and he is 5 feet 10 inches.  As far as the COVID vaccination is  concerned, he did get the Moderna x2.    Medical hx Surgical hx   Past Medical History:   Diagnosis Date     Alcohol abuse      Chronic alcoholic liver disease (H)       No past surgical history on file.       Medications  Current Outpatient Medications   Medication Sig Dispense Refill     albuterol (PROAIR HFA/PROVENTIL HFA/VENTOLIN HFA) 108 (90 Base) MCG/ACT inhaler INHALE 2 PUFFS BY MOUTH EVERY 4-6 HOURS AS NEEDED FOR SHORTNESS OF BREATH OR COUGH FOR UP TO 5 DAYS SHAKE WELL BEFORE USING.       furosemide (LASIX) 20 MG tablet Take 20 mg by mouth daily        loratadine (CLARITIN) 10 MG tablet Take 10 mg by mouth       ondansetron (ZOFRAN-ODT) 4 MG ODT tab TAKE 1 TABLET (4 MG) BY MOUTH EVERY 4 HOURS AS NEEDED FOR NAUSEA OR VOMITING       pantoprazole (PROTONIX) 40 MG EC tablet Take 40 mg by mouth daily        spironolactone (ALDACTONE) 50 MG tablet Take 50 mg by mouth daily        albuterol (PROAIR HFA/PROVENTIL HFA/VENTOLIN HFA) 108 (90 Base) MCG/ACT inhaler Inhale 2 puffs into the lungs (Patient not taking: Reported on 12/1/2021)       ciprofloxacin (CIPRO) 500 MG tablet TAKE 1 TABLET (500 MG) BY MOUTH 2 TIMES A DAY FOR 7 DAYS (Patient not taking: Reported on 12/1/2021)       HYDROcodone-acetaminophen (NORCO)  MG per tablet TAKE 1 TABLET BY MOUTH 2 TIMES A DAY AS NEEDED FOR MODERATE PAIN FOR UP TO 7 DAYS (Patient not taking: Reported on 11/17/2021)       Oxymetazoline HCl (NASAL SPRAY) 0.05 % SOLN Spray 1-2 sprays in nostril (Patient not taking: Reported on 12/1/2021)       pantoprazole (PROTONIX) 40 MG EC tablet TAKE 1 TABLET (40 MG) BY MOUTH 1 TIME A DAY IN THE MORNING (Patient not taking: Reported on 12/1/2021)         Allergies  No Known Allergies    Family hx Social hx   Family History   Problem Relation Age of Onset     Substance Abuse Father      Substance Abuse Paternal Grandmother      Substance Abuse Paternal Grandfather       Social History     Tobacco Use     Smoking status: Current Every Day  Smoker     Packs/day: 0.75     Years: 20.00     Pack years: 15.00     Types: Cigarettes     Smokeless tobacco: Never Used   Substance Use Topics     Alcohol use: Not Currently     Comment: Last ETOH 8/2021     Drug use: Not Currently     Types: Marijuana     Comment: Quit, Smokes marijuana daily          Review of systems  He denies any infections recently.  He has fatigue.  Has occasional headaches.  Has although he had withdrawals from alcohol, he never had seizures.  He has cough as the patient is a smoker, does not have any significant shortness of breath or dyspnea on exertion.  He denies any chest pain also.  He has no known anemia or easy bruising.  He has right lung fluid with shortness of breath related with that.  He is not known to have diabetes or thyroid disease.  He is not known to have any degenerative joint disease.  Psychiatric-wise, he is known to have been on alcohol for quite a lot of time and he attributes that to PTSD and when his wife passed away.  He has also no hearing or skin issues.          Examination  There were no vitals taken for this visit.  There is no height or weight on file to calculate BMI.    Gen- well, NAD, A+Ox3, normal color  Psych- normal mood    Laboratory    Radiology    ASSESSMENT AND PLAN:  Alcoholic liver disease.  Mr. Long has alcoholic liver disease.  He has hepatomegaly, which means that he has a component of fatty infiltration of the liver and inflammation.  There is a chance that if he abstains, he will improve.  He has a low MELD score of around 17, and he had discontinued alcohol 09/08/2021.  This makes him ineligible for transplantation evaluation, in which the patient was interested.  We did explain to him that he will need, above all, 6 months sobriety.  He will need to have a followup and clearance from Chemical Dependency or a psychiatrist.  He has failed like 4 treatments in the past and has done court sanctioned inpatient treatment, where he stayed 40  days in an inpatient treatment center.  He has also multiple DUIs.  Considering all that, that will be the first step and if his MELD score does not drop and he becomes eligible because of that, in 6 months then he will be reevaluated.  I will see him here, repeat his MELD score and see if he has worsened.      Otherwise, he will need imaging every 6 months to do surveillance for HCC and he will need to have his EGD done locally.  His last one done there showed grade 3 esophageal varices with stigmata of recent bleed, and this was banded and completely eradicated.  For all his other medical issues, he will follow up with his primary care physician and he will be seen here in 3 months.    This was a 45-minute visit, of which more than 50% was spent in explaining to the patient what our plan of care was.  We answered all his questions and that of his significant other.          cc:  Stanley Amato MD  [facility]  [address] [place] [address]      Dario Hendricks MD  Hepatology  [facility]      Video-Visit Details    Type of service:  Video Visit    Video End Time:9:33 Am.    Originating Location (pt. Location): Home    Distant Location (provider location):  [facility] HEPATOLOGY [facility]     Platform used for Video Visit: [employer]

## 2021-12-09 ENCOUNTER — TELEPHONE (OUTPATIENT)
Dept: TRANSPLANT | Facility: CLINIC | Age: 46
End: 2021-12-09
Payer: MEDICAID

## 2021-12-09 NOTE — TELEPHONE ENCOUNTER
Transplant Social Work Services Phone Call    Data: I called Jose Elias and reviewed chemical dependency recommendations. I also reviewed that CD assessments are typically only valid for 30-45 days. He completed the assessment on 11/17/2021. Jose Elias forgot to call the treatment center and will do so today. I pressed that he will need to complete an updated assessment and encourage him to call before 12/17/2021. He voiced understanding and did not have any other questions/concerns.   Intervention: Continued liver transplant evaluation   Assessment: Jose Elias is recommended to engage in an intensive outpatient program.   Education provided by SW: Chemical dependency assessment  Plan: This writer will follow up regarding engagement in chemical dependency treatment.     PUNEET Kolb, [facility]  Liver Transplant   [facility]  Phone: [phone]  Pager: [phone]

## 2021-12-21 ENCOUNTER — TELEPHONE (OUTPATIENT)
Dept: TRANSPLANT | Facility: CLINIC | Age: 46
End: 2021-12-21
Payer: MEDICAID

## 2021-12-21 NOTE — TELEPHONE ENCOUNTER
Transplant Social Work Services Phone Call    Data: I received a voice message from Andrei requesting that we sent his chemical dependency assessment to [facility] Attn: Jamia Fax: [phone]. I called Andrei and indicated we are able to send with an release of information. I obtained a verbal consent, witnessed by Kaylynn Carr [facility]. AVE completed and chemical dependency assessment sent on 12/22/2021.   Intervention: Continued liver transplant evaluation   Assessment: Andrei being evaluated for potential liver transplant  Education provided by SW: Obtaining AVE   Plan: Will follow up on progress in chemical dependency treatment.     PUNEET Kolb, [facility]  Liver Transplant   [facility]  Phone: [phone]  Pager: [phone]

## 2022-01-18 NOTE — TELEPHONE ENCOUNTER
I called Andrei's phone 2x and unable to leave a message. I sent Andrei a follow up email as his mychart is not setup. I inquired about progress in treatment as this writer sent assessment to [facility] per his request in December.

## 2022-03-24 ENCOUNTER — TELEPHONE (OUTPATIENT)
Dept: TRANSPLANT | Facility: CLINIC | Age: 47
End: 2022-03-24
Payer: MEDICAID

## 2022-03-24 NOTE — TELEPHONE ENCOUNTER
Transplant Social Work Services Phone Call    Data: I called Andrei to follow up. Andrei reports that he has not engaged in the recommended treatment program. He indicated that he never got a call back from [facility] and never followed up either. I reviewed recommendation and importance of engagement in treatment. I indicated that at this time he will need to complete a ANAIS update. He prefers to go through our center for the assessment as they have his information. Today he reports that he is 187 days sober today (6 months sober)  Andrei inquired about appointment's with Dr. Hendricks, as he has not heard back     Intervention: Continued liver consultation   Assessment: Andrei has not engaged in treatment and will need to do so.   Education provided by SW: CD assessment and treatment   Plan: This writer will follow up next week to assist with engagement in treatment.     PUNEET Kong, [facility]  Liver Transplant   [facility]  Phone: [phone]  Pager: [phone]

## 2022-03-28 ENCOUNTER — HOSPITAL ENCOUNTER (OUTPATIENT)
Dept: BEHAVIORAL HEALTH | Facility: CLINIC | Age: 47
Discharge: HOME | End: 2022-03-28
Attending: FAMILY MEDICINE | Admitting: FAMILY MEDICINE
Payer: MEDICAID

## 2022-03-28 VITALS — HEIGHT: 70 IN | BODY MASS INDEX: 28.63 KG/M2 | WEIGHT: 200 LBS

## 2022-03-28 PROCEDURE — H0001 ALCOHOL AND/OR DRUG ASSESS: HCPCS | Mod: GT

## 2022-03-28 RX ORDER — CEPHALEXIN 500 MG/1
CAPSULE ORAL
COMMUNITY
Start: 2022-03-01

## 2022-03-28 RX ORDER — ONDANSETRON 4 MG/1
TABLET, FILM COATED ORAL
COMMUNITY
Start: 2022-02-17

## 2022-03-28 RX ORDER — HYDROXYZINE HYDROCHLORIDE 25 MG/1
25 TABLET, FILM COATED ORAL
COMMUNITY
Start: 2022-02-17 | End: 2022-05-18

## 2022-03-28 RX ORDER — SULFAMETHOXAZOLE/TRIMETHOPRIM 800-160 MG
TABLET ORAL
COMMUNITY
Start: 2022-02-08

## 2022-03-28 RX ORDER — IBUPROFEN 800 MG/1
800 TABLET, FILM COATED ORAL
COMMUNITY
Start: 2022-01-18

## 2022-03-28 ASSESSMENT — COLUMBIA-SUICIDE SEVERITY RATING SCALE - C-SSRS
5. HAVE YOU STARTED TO WORK OUT OR WORKED OUT THE DETAILS OF HOW TO KILL YOURSELF? DO YOU INTEND TO CARRY OUT THIS PLAN?: NO
4. HAVE YOU HAD THESE THOUGHTS AND HAD SOME INTENTION OF ACTING ON THEM?: NO
1. IN THE PAST MONTH, HAVE YOU WISHED YOU WERE DEAD OR WISHED YOU COULD GO TO SLEEP AND NOT WAKE UP?: NO
3. HAVE YOU BEEN THINKING ABOUT HOW YOU MIGHT KILL YOURSELF?: NO
2. HAVE YOU ACTUALLY HAD ANY THOUGHTS OF KILLING YOURSELF IN THE PAST MONTH?: NO
6. HAVE YOU EVER DONE ANYTHING, STARTED TO DO ANYTHING, OR PREPARED TO DO ANYTHING TO END YOUR LIFE?: NO

## 2022-03-28 ASSESSMENT — ANXIETY QUESTIONNAIRES
6. BECOMING EASILY ANNOYED OR IRRITABLE: NEARLY EVERY DAY
2. NOT BEING ABLE TO STOP OR CONTROL WORRYING: NEARLY EVERY DAY
GAD7 TOTAL SCORE: 15
4. TROUBLE RELAXING: NEARLY EVERY DAY
7. FEELING AFRAID AS IF SOMETHING AWFUL MIGHT HAPPEN: SEVERAL DAYS
2. FELT ANXIOUS, WORRIED, OR NERVOUS: SEVERAL DAYS
5. BEING SO RESTLESS THAT IT IS HARD TO SIT STILL: SEVERAL DAYS
3. WORRYING TOO MUCH ABOUT DIFFERENT THINGS: NEARLY EVERY DAY

## 2022-03-28 ASSESSMENT — PAIN SCALES - GENERAL: PAINLEVEL: MODERATE PAIN (5)

## 2022-03-28 NOTE — PROGRESS NOTES
Andrei Long Release of Information requests were e-mailed to the [place] [id number] [email or web address] on 3/28/2022 with requests for the following releases:    Patient's e-mail address: [email or web address]    1.) ANAIS - [id number] - Collateral Contact & Emergency Contact   Monashivam Murguia  Tel #: [phone]    2.) ANAIS - [id number] - Exchange of MH & ANAIS Records  [place] [address]  Phone: [phone]  Fax: [phone]

## 2022-03-28 NOTE — PROGRESS NOTES
Type Of Assessment: Comprehensive Assessment Update    Referral Source:  [facility] [place] [facility]  MRN: [id number]    DATE OF SERVICE: 2022  Date of previous ANAIS Assessment: 21, by MICH Crane, [facility]  Patient confirmed identity through two factor verification: Full Legal Name,  and SSN    PATIENT'S NAME: Andrei Long  Age: 46 year old  Last 4 SSN: [id number]  Sex: male   Gender Identity: male  Sexual Orientation: Heterosexual  Cultural Background: Yes, Racial or Ethnic Self-Identification , patient is enrolled in the [facility] [employer]  YOB: 1975  Current Address:   [address] [place] [address]  Patient Phone Number:  [phone]   Patient's E-Mail Contact:  [email or web address]  Funding: Medicaid [place]  PMI: [id number]   Emergency Contact: rand Curry, [phone]  DAANES information was provided to patient and patient does not want a copy.     Telemedicine Visit: The patient's condition can be safely assessed and treated via synchronous audio and visual telemedicine encounter.    Reason for Telemedicine Visit: Patient has requested telehealth visit  Originating Site (Patient Location): Patient's home  Distant Site (Provider Location): Provider Remote Setting- Home Office  Consent:  The patient/guardian has verbally consented to: the potential risks and benefits of telemedicine (video visit) versus in person care; bill my insurance or make self-payment for services provided; and responsibility for payment of non-covered services.   Mode of Communication:  Video Conference via [employer]    START TIME: 1:00pm  END TIME: 1:58pm    As the provider I attest to compliance with applicable laws and regulations related to telemedicine.   Andrei Long was seen for a substance use disorder consult on 3/28/2022 by MICH Blair.    Reason for Substance Use Disorder Consult:  Per note from Transplant Team 3/24/22: Patient did not follow up  "with treatment recommendations from the assessment dated 11/17/21 and will need an update to enter treatment.    Are you currently having severe withdrawal symptoms that are putting yourself or others in danger? No  Are you currently having severe medical problems that require immediate attention? No  Are you currently having severe emotional or behavioral problems that are putting yourself or others at risk of harm? No    Have you participated in prior substance use disorder evaluations? Yes. When, Where, and What circumstances: 11/17/21 with Hermelindo Amato   Have you ever been to detox, inpatient or outpatient treatment for substance related use? List previous treatment: Yes. When, Where, and What circumstances: Patient went to detox 12 years ago. Patient did not attend treatment afterwards.   Have you ever had a gambling problem or had treatment for compulsive gambling? No  Patient does not appear to be in severe withdrawal, an imminent safety risk to self or others, or requiring immediate medical attention and may proceed with the assessment interview.    Comprehensive Substance Use History   X X = Primary Drug Used Age of First Use    Pattern of Substance Use   (heaviest use in life and a use history within the past year if applicable) (DSM-5: Sx #3) Date /  Quantity of last use if within the past 30 days Withdrawal Potential?   Method of use  (Oral, smoked, snorted, IV, etc)   X Alcohol   16 No current use.    Patient said that he was drinking all day, every day, drinking 1 pt of hard liquor (VS[employer]Mo[employer] jacqueline) in the AM and a couple of tall beers.  In the evening, he would repeat this process. Patient was diagnosed with liver issues in September 2021. 9/8/21 No Oral    Marijuana/Hashish   20 Current use: Patient began using medical marijuana for pain associated with liver cirrhosis.  Patient said he has a prescription for \"the flower.\" 3/28/22 No Smoke    Cocaine/Crack No use        Meth/Amphetamines   No use     "    Heroin   No use        Other Opiates/Synthetics   No use        Inhalants  No use        Benzodiazepines   No use        Hallucinogens   No use        Barbiturates/Sedatives/Hypnotics   No use        Over-the-Counter Drugs   No use        Other   No use        Nicotine   17 Current use: 1 PPD, for the past 25 years. 3/28/22 Yes Smoke     Withdrawal symptoms: Have you had any of the following withdrawal symptoms?    Sweating (Rapid Pulse)  Unable to Sleep  Headache  Muscle Aches  Nausea / Vomiting  Hallucinations  Fever  Unable to Eat    Have you experienced any cravings?  No    Have you had periods of abstinence?  Yes   What was your longest period? 2 years    Any circumstances that lead to relapse? 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    What activities have you engaged in when using alcohol/other drugs that could be hazardous to you or others?  The patient reported having a history of driving while under the influence of alcohol or drugs.  Patient has 2 prior DUIs.    A description of any risk-taking behavior, including behavior that puts the client at risk of exposure to blood-borne or sexually transmitted diseases: Driving under the influence.    Arrests and legal interventions related to substance use: Patient has 3 DUIs.    A description of how the patient's use affected their ability to function appropriately in a work setting: Patient denied.    A description of how the patient's use affected their ability to function appropriately in an educational setting: Patient denied.    Leisure time activities that are associated with substance use: Patient said he would sit at home and drink by himself.    Do you think your substance use has become a problem for you? He agrees he has a substance abuse problem.  Patient said his drinking became a problem after his wife .    MEDICAL HISTORY  Physical or medical concerns or diagnoses: Alcohol cirrhosis of the  liver, banding in his throat, gall bladder stones.    Do you have any current medical treatment needs not being addressed by inpatient treatment?  Patient is not in the hospital.    Do you need a referral for a medical provider? Patient has a PCP named Kimi Fang, and Dr. Umanzor.    Current medications:   Patient reports current meds as:   Outpatient Medications Marked as Taking for the 3/28/22 encounter (Hospital Encounter) with Zaina Arellano LADC   Medication Sig     albuterol (PROAIR HFA/PROVENTIL HFA/VENTOLIN HFA) 108 (90 Base) MCG/ACT inhaler INHALE 2 PUFFS BY MOUTH EVERY 4-6 HOURS AS NEEDED FOR SHORTNESS OF BREATH OR COUGH FOR UP TO 5 DAYS SHAKE WELL BEFORE USING.     furosemide (LASIX) 20 MG tablet Take 20 mg by mouth daily      hydrOXYzine (ATARAX) 25 MG tablet Take 25 mg by mouth     loratadine (CLARITIN) 10 MG tablet Take 10 mg by mouth     ondansetron (ZOFRAN) 4 MG tablet TAKE 1 TABLET (4 MG) BY MOUTH 3 TIMES A DAY AS NEEDED FOR NAUSEA OR VOMITING         Are you pregnant? NA, Male    Do you have any specific physical needs/accommodations? No    MENTAL HEALTH HISTORY:  Have you ever had  hospitalizations or treatment for mental health illness: No    Mental health history, including diagnosis and symptoms, and the effect on the client's ability to function: PTSD from his wife passing away.    Current mental health treatment including psychotropic medication needed to maintain stability: (Note: The assessment must utilize screening tools approved by the commissioner pursuant to section 245.4863 to identify whether the client screens positive for co-occurring disorders): Patient denied.    GAIN-SS Tool:  When was the last time that you had significant problems... 11/17/2021   with feeling very trapped, lonely, sad, blue, depressed or hopeless about the future? Past month   with sleep trouble, such as bad dreams, sleeping restlessly, or falling asleep during the day? Past Month   with feeling very  anxious, nervous, tense, scared, panicked or like something bad was going to happen? Past month   with becoming very distressed & upset when something reminded you of the past? Past month   with thinking about ending your life or committing suicide? Never     When was the last time that you did the following things 2 or more times? 11/17/2021   Lied or conned to get things you wanted or to avoid having to do something? Never   Had a hard time paying attention at school, work or home? Past month   Had a hard time listening to instructions at school, work or home? Past month   Were a bully or threatened other people? 1+ years ago   Started physical fights with other people? 1+ years ago       Have you ever been verbally, emotionally, physically or sexually abused?   The patient denied having any history of being verbally, emotionally, physically or sexually abused.    Family history of substance use and misuse: Patient's father and paternal grandparents has substance use issues with alcohol.    The patient's desire for family involvement in the treatment program: Not at this time.  Level of family support: Patient said his family is very supportive.    Social network in relation to expected support for recovery: Patient said he has sober friends.    Are you currently in a significant relationship? Yes.  4B. How long? 6 years  Please describe your significant other's use of mood altering chemicals? Patient said his fiancee is sober.    Do you have any children (include living arrangements/custody/contact)?:  4 children, who all live with him.    What is your current living situation? Patient lives in his own home and housing is stable.    Are you employed/attending school? Patient is not employed or attending school.    SUMMARY:  Ability to understand written treatment materials: Yes  Ability to understand patient rules and patient rights: Yes  Does the patient recognize needs related to substance use and is willing to  follow treatment recommendations: Yes  Does the patient have an opioid use disorder:  does not have a history of opiate use.    ASAM Dimension Scale Ratings:  Dimension 1: 0 Client displays full functioning with good ability to tolerate and cope with withdrawal discomfort. No signs or symptoms of intoxication or withdrawal or resolving signs or symptoms.  Dimension 2: 1 Client tolerates and lizzie with physical discomfort and is able to get the services that the client needs.  Dimension 3: 2 Client has difficulty with impulse control and lacks coping skills. Client has thoughts of suicide or harm to others without means; however, the thoughts may interfere with participation in some treatment activities. Client has difficulty functioning in significant life areas. Client has moderate symptoms of emotional, behavioral, or cognitive problems. Client is able to participate in most treatment activities.  Dimension 4: 2 Client displays verbal compliance, but lacks consistent behaviors; has low motivation for change; and is passively involved in treatment.  Dimension 5: 3 Client has poor recognition and understanding of relapse and recidivism issues and displays moderately high vulnerability for further substance use or mental health problems. Client has few coping skills and rarely applies coping skills.  Dimension 6: 3 Client is not engaged in structured, meaningful activity and the client's peers, family, significant other, and living environment are unsupportive, or there is significant criminal justice system involvement.    Category of Substance Severity (ICD-10 Code / DSM 5 Code)     Alcohol Use Disorder Severe  (10.20) (303.90)   Cannabis Use Disorder Mild  (F12.10) (305.20)   Hallucinogen Use Disorder The patient does not meet the criteria for a Hallucinogen use disorder.   Inhalant Use Disorder The patient does not meet the criteria for an Inhalant use disorder.   Opioid Use Disorder The patient does not meet the  criteria for an Opioid use disorder.   Sedative, Hypnotic, or Anxiolytic Use Disorder The patient does not meet the criteria for a Sedative/Hypnotic use disorder.   Stimulant Related Disorder The patient does not meet the criteria for a Stimulant use disorder.   Tobacco Use Disorder Severe   (F17.200) (305.1)    Other (or unknown) Substance Use Disorder The patient does not meet the criteria for a Other (or unknown) Substance use disorder.     A problematic pattern of alcohol/drug use leading to clinically significant impairment or distress, as manifested by at least two of the following, occurring within a 12-month period:    1.) Alcohol/drug is often taken in larger amounts or over a longer period than was intended.  2.) There is a persistent desire or unsuccessful efforts to cut down or control alcohol/drug use  3.) A great deal of time is spent in activities necessary to obtain alcohol, use alcohol, or recover from its effects.  5.) Recurrent alcohol/drug use resulting in a failure to fulfill major role obligations at work, school or home.  6.) Continued alcohol use despite having persistent or recurrent social or interpersonal problems caused or exacerbated by the effects of alcohol/drug.  7.) Important social, occupational, or recreational activities are given up or reduced because of alcohol/drug use.  8.) Recurrent alcohol/drug use in situations in which it is physically hazardous.  9.) Alcohol/drug use is continued despite knowledge of having a persistent or recurrent physical or psychological problem that is likely to have been caused or exacerbated by alcohol.  10.) Tolerance, as defined by either of the following: A need for markedly increased amounts of alcohol/drug to achieve intoxication or desired effect.  11.) Withdrawal, as manifested by either of the following: The characteristic withdrawal syndrome for alcohol/drug (refer to Criteria A and B of the criteria set for alcohol/drug  withdrawal).    Specify if: In early remission:  After full criteria for alcohol/drug use disorder were previously met, none of the criteria for alcohol/drug use disorder have been met for at least 3 months but for less than 12 months (with the exception that Criterion A4,  Craving or a strong desire or urge to use alcohol/drug  may be met).     In sustained remission:   After full criteria for alcohol use disorder were previously met, non of the criteria for alcohol/drug use disorder have been met at any time during a period of 12 months or longer (with the exception that Criterion A4,  Craving or strong desire or urge to use alcohol/drug  may be met).     Specify if:   This additional specifier is used if the individual is in an environment where access to alcohol is restricted.    Mild: Presence of 2-3 symptoms  Moderate: Presence of 4-5 symptoms  Severe: Presence of 6 or more symptoms    Collateral information: ANAIS Collateral Info: Sufficient information is obtained from the patient to support diagnosis and recommendations. Contact with a collateral sources is not required.    Recommendations:   1)  Complete an Intensive Outpatient CD Treatment Program, by a provider of your choice.  2)  Abstain from all mood-altering chemicals unless prescribed by a licensed provider.   3)  Attend, at minimum, 2 weekly support group meetings, such as 12 step based (AA/NA), SMART Recovery, Health Realizations, and/or Refuge Recovery meetings.     4)  Actively work with a male mentor/sponsor on a weekly basis.   5)  Follow all the recommendations of your treatment/medical providers.    Clinical Substantiation:  Met with patient individually on 3/28/22 for a comprehensive assessment including summary of assessment and conclusion, assessment risk and appropriate steps taken, documentation to support the diagnosis, rationale for disposition and appropriate level of care recommended and alternative for treatment options.  Patient  completed an evaluation in November 2021 and failed to follow through with the recommendations.  The patient said he had the intake scheduled and then he got COVID and had to cancel.  The patient said that the program never called him back and he didn't engage until after speaking with the  on the liver transplant team to follow through with the recommended outpatient treatment.    Rational for recommended level of care: The patient lacks long-term sober maintenance skills, lacks sober coping skills, lacks awareness regarding the disease model of addiction, lacks a sober peer support network and has medical issues which are exacerbated by substance abuse.    The patient reported she is willing to follow the above recommendations.    Referrals/ Alternatives:  [facility]  [address] [place] [address]  Phone: [phone]  Fax: [phone]    [facility] Assessment ID: [id number]     ANAIS consult completed by: Zaina Arellano [facility] [place][note 2].  Phone Number: [phone]  E-mail Address: [email or web address] Evaluation Department  [address] [place] [address]     *Due to regulation of Title 42 of the Code of Federal Regulations (CFR) Part 2: Confidentiality laws apply to this note and the information wherein.  Thus, this note cannot be copy and pasted into any other health care staff's note nor can it be included in general medical records sent to ANY outside agency without the patient's written consent.

## 2022-04-04 ENCOUNTER — TELEPHONE (OUTPATIENT)
Dept: TRANSPLANT | Facility: CLINIC | Age: 47
End: 2022-04-04
Payer: MEDICAID

## 2022-04-04 NOTE — TELEPHONE ENCOUNTER
Transplant [facility] Work [facility] Phone Call      Data: I called Andrei and left a voice message requesting a call back to discuss status of treatment. Patient completed a new CD assessment, which recommended OP treatment.   Intervention: continued liver consultation  Assessment: deferred  Education provided by [facility]: deferred  Plan: Waiting for a return call     PUNEET Kong, [facility]  Liver Transplant   [facility]  Phone: [phone]  Pager: [phone]

## 2022-04-12 NOTE — TELEPHONE ENCOUNTER
I received communication from Jamia Carter through [facility] indicating that patient has started treatment as of 4/11/2022. He will completing his treatment plan tomorrow and she indicated she will send this writer a copy of the treatment plan once completed.     Per Jamia, their program is open ended for timeline. He will not complete the program until his treatment goals have been completed, which can vary. Andrei can continue with support if that is needed once he graduates.

## 2022-04-15 DIAGNOSIS — K70.30 ALCOHOLIC CIRRHOSIS (H): Primary | ICD-10-CM

## 2022-04-15 NOTE — LETTER
PHYSICIAN ORDERS      DATE & TIME ISSUED: April 15, 2022 8:10 AM  PATIENT NAME: Andrei Long   : 1975     [facility] MR# : [id number]     DIAGNOSIS:  Cirrhosis  ICD-10 CODE: K70.31  Orders  1 year after issue.    Please have the following lab drawn now:     Phosphatidylethanol (PEth)    Additionally, please have the following labs drawn in approximately one month. These labs must be drawn all together on the same day when done:     Phosphatidylethanol (PEth)     Comprehensive Metabolic Panel          INR     CBC with platelets     For clinical questions, please contact Nancy Vences RN, at [phone]. PLEASE FAX RESULTS AS SOON AS POSSIBLE TO [phone], ATTN:Dileep    .

## 2022-04-15 NOTE — PROGRESS NOTES
Spoke with Andrei.  MELD down to 15.  Has just hit a little over 6 mos sobriety, has just started treatment recently    Spoke with patient about plan: Follow up appt with Eladio- next available, virtual..  PEth locally now.  Fresh set of labs including PEth right before appointment.  At next appt determine if appropriate for eval for close if continued improvement.

## 2022-04-21 ENCOUNTER — TRANSFERRED RECORDS (OUTPATIENT)
Dept: HEALTH INFORMATION MANAGEMENT | Facility: CLINIC | Age: 47
End: 2022-04-21
Payer: MEDICAID

## 2022-04-21 ENCOUNTER — TELEPHONE (OUTPATIENT)
Dept: TRANSPLANT | Facility: CLINIC | Age: 47
End: 2022-04-21
Payer: MEDICAID

## 2022-04-21 NOTE — TELEPHONE ENCOUNTER
I received documentation from  confirming that patient complete a new CD assessment on 4/6/2022, which recommended outpatient treatment and also recommended a diagnostic assessment with a mental health provider to rule out mental health concerns.     I received treatment plan for patient. He started programming on 4/21/2022

## 2022-04-27 ENCOUNTER — VIRTUAL VISIT (OUTPATIENT)
Dept: GASTROENTEROLOGY | Facility: CLINIC | Age: 47
End: 2022-04-27
Attending: INTERNAL MEDICINE
Payer: MEDICAID

## 2022-04-27 ENCOUNTER — DOCUMENTATION ONLY (OUTPATIENT)
Dept: GASTROENTEROLOGY | Facility: CLINIC | Age: 47
End: 2022-04-27
Payer: MEDICAID

## 2022-04-27 DIAGNOSIS — K70.31 ALCOHOLIC CIRRHOSIS OF LIVER WITH ASCITES (H): Primary | ICD-10-CM

## 2022-04-27 PROCEDURE — 99214 OFFICE O/P EST MOD 30 MIN: CPT | Mod: 95 | Performed by: INTERNAL MEDICINE

## 2022-04-27 NOTE — LETTER
4/27/2022     RE: Andrei Long  [address]  [place] [address]    Dear Colleague,    Thank you for referring your patient, Andrei Long, to the [facility] HEPATOLOG[facility]. Please see a copy of my visit note below.    Andrei is a 46 year old who is being evaluated via a billable video visit.  .    How would you like to obtain your AVS? MyChart  If the video visit is dropped, the invitation should be resent by: Text to cell phone: [phone]  Will anyone else be joining your video visit? No      Video Start Time: 9:31 AM  Video-Visit Details    Type of service:  Video Visit    Video End Time:9:31 AM    Originating Location (pt. Location): Home    Distant Location (provider location):  [facility]PATOLOG[facility] .    Platform used for Video Visit: Qoollanie Rodriguez MA on 4/27/2022 at 9:31 AM      Date of Service: 4/27/2022       Subjective:            Andrei Long is a 46 year old male presenting for evaluation of liver disease    - Etiology: ETOH Cirrhosis   - hx ascites  - hx HE  - last EGD March 2022  - HCC screening Sep 2021    History of Present Illness   Andrei Long is a 46 year old male with past medical history of alcoholic cirrhosis complicated by ascites, hepatic encephalopathy and esophageal varices who was initially seen upon a virtual visit in December 2021 and is following up.    Patient stated that he has been undergoing regular endoscopies for his esophageal varices and is having another one pretty soon.  He denies any melena or hematochezia.  Having bowel movement once per day.  He does has hepatic encephalopathy with sleep pattern disturbance but is not taking any medication for it.    He denies any lower extremity edema and used to had severe ascites for which she was undergoing paracentesis and his last paracentesis was in January.  He is taking diuretics but is using as needed if he feels his abdomen to start having some  distention.  He has been having some stretch pains in the abdominal area.  He has been taking PPI which has been helping along with Zofran.  His appetite is slowly improving.  No smoking last alcohol was in September 2021.  Has been undergoing alcohol-related treatment and has gone there twice.      Past Medical History:  Past Medical History:   Diagnosis Date     Alcohol abuse      Chronic alcoholic liver disease (H)        Surgical History:  Past Surgical History:   Procedure Laterality Date     HERNIA REPAIR         Social History:  Social History     Tobacco Use     Smoking status: Current Every Day Smoker     Packs/day: 0.75     Years: 20.00     Pack years: 15.00     Types: Cigarettes     Smokeless tobacco: Never Used   Substance Use Topics     Alcohol use: Not Currently     Comment: Last ETOH 8/2021     Drug use: Not Currently     Types: Marijuana     Comment: Quit, Smokes marijuana daily       Family History:  Family History   Problem Relation Age of Onset     Substance Abuse Father      Substance Abuse Paternal Grandmother      Substance Abuse Paternal Grandfather        Medications:  Current Outpatient Medications   Medication     albuterol (PROAIR HFA/PROVENTIL HFA/VENTOLIN HFA) 108 (90 Base) MCG/ACT inhaler     furosemide (LASIX) 20 MG tablet     hydrOXYzine (ATARAX) 25 MG tablet     loratadine (CLARITIN) 10 MG tablet     ondansetron (ZOFRAN) 4 MG tablet     albuterol (PROAIR HFA/PROVENTIL HFA/VENTOLIN HFA) 108 (90 Base) MCG/ACT inhaler     cephALEXin (KEFLEX) 500 MG capsule     ciprofloxacin (CIPRO) 500 MG tablet     HYDROcodone-acetaminophen (NORCO)  MG per tablet     ibuprofen (ADVIL/MOTRIN) 800 MG tablet     ondansetron (ZOFRAN-ODT) 4 MG ODT tab     Oxymetazoline HCl (NASAL SPRAY) 0.05 % SOLN     pantoprazole (PROTONIX) 40 MG EC tablet     spironolactone (ALDACTONE) 50 MG tablet     sulfamethoxazole-trimethoprim (BACTRIM DS) 800-160 MG tablet     No current facility-administered medications for  this visit.       Review of Systems  A complete 10 point review of systems was asked and answered in the negative unless specifically commented upon in the HPI      Labs and Diagnostic tests:  Recent labs with sodium 141, creatinine 0.73  Total bili 2.2, INR 1.7    MELD-NA 15  Imaging:  US ABD 1/21/22:  IMPRESSION:     Coarsened liver parenchymal echogenicity compatible with cirrhosis. Minimal free fluid in the right upper quadrant.     Cholelithiasis    Procedures:  EGD 3/3/22:  Impression:        - Grade III esophageal varices with stigmata of recent bleeding.        Completely eradicated. Banded.        - Portal hypertensive gastropathy.        - A medium amount of food (residue) in the stomach.        - Gastroparesis.        - Normal examined duodenum.        - No specimens collected.       Assessment and Plan:  46 year old male with past medical history of alcoholic cirrhosis complicated by ascites, hepatic encephalopathy and esophageal varices who was initially seen upon a virtual visit in December 2021 and is following up.    Alcoholic hepatitis and cirrhosis:    Patient had alcoholic hepatitis with his last alcohol drink in September 2021.  He is able to achieve 6 months of sobriety which would be his longest as he had multiple recurrences in the past, he had 4 failed treatments in the past and had done a court sanctioned inpatient treatment for 40 days and had multiple DUIs in the past.    His meld score is constantly decreasing and is now 15, hopefully it will continue to do decrease down with continuation of sobriety.  We will see him in 3 months as an in person visit and will decide depending upon his meld score at that point, he might not need liver transplantation.      Hepatocellular Cancer Screening:   - Recommend screening for HCC every 6 months with either abdominal ultrasound or by alternating abdominal ultrasound with EITHER a triple/quad phase CT Liver with IV contrast OR a Quad phase MRI Liver  with IV contrast.  AFP levels should be checked every 6 months at time of imaging screen.    Ascites:  -Very minimal and no recent paracentesis, start taking diuretics daily  - Repeat blood work in 2 weeks to follow creatinine  - Continue to follow a sodium restricted (<2g sodium diet)     Hepatic Encephalopathy:  -Patient can be started on lactulose    Esophageal Varices:   - Recommend repeating an upper GI endoscopy as scheduled.  If evidence of medium/large esophageal varices, would recommend esophageal varix band ligation .  If band ligation is performed, please continue to repeat until eraditation of varices.    --Patient would need colonoscopy for colo-rectal cancer screening  -- Avoid NSAIDs      Follow Up:  - in 3 months    Pt was seen and discussed with Dr. Hendricks.  Thank you very much for the opportunity to participate in the care of this patient.  If you have any further questions, please don't hesitate to contact our office.    Chidi Roldan M.D.  Advanced & Transplant Hepatology Fellow  [facility]    Attestation:  This patient has been seen and evaluated by me, Dario Hendricks.  Discussed with the house staff team or resident(s) and agree with the findings and plan in this note.     Dario Hendricks MD

## 2022-04-27 NOTE — PROGRESS NOTES
Date of Service: 4/27/2022       Subjective:            Andrei Long is a 46 year old male presenting for evaluation of liver disease    - Etiology: ETOH Cirrhosis   - hx ascites  - hx HE  - last EGD March 2022  - HCC screening Sep 2021    History of Present Illness   Andrei Long is a 46 year old male with past medical history of alcoholic cirrhosis complicated by ascites, hepatic encephalopathy and esophageal varices who was initially seen upon a virtual visit in December 2021 and is following up.    Patient stated that he has been undergoing regular endoscopies for his esophageal varices and is having another one pretty soon.  He denies any melena or hematochezia.  Having bowel movement once per day.  He does has hepatic encephalopathy with sleep pattern disturbance but is not taking any medication for it.    He denies any lower extremity edema and used to had severe ascites for which she was undergoing paracentesis and his last paracentesis was in January.  He is taking diuretics but is using as needed if he feels his abdomen to start having some distention.  He has been having some stretch pains in the abdominal area.  He has been taking PPI which has been helping along with Zofran.  His appetite is slowly improving.  No smoking last alcohol was in September 2021.  Has been undergoing alcohol-related treatment and has gone there twice.      Past Medical History:  Past Medical History:   Diagnosis Date     Alcohol abuse      Chronic alcoholic liver disease (H)        Surgical History:  Past Surgical History:   Procedure Laterality Date     HERNIA REPAIR         Social History:  Social History     Tobacco Use     Smoking status: Current Every Day Smoker     Packs/day: 0.75     Years: 20.00     Pack years: 15.00     Types: Cigarettes     Smokeless tobacco: Never Used   Substance Use Topics     Alcohol use: Not Currently     Comment: Last ETOH 8/2021     Drug use: Not Currently     Types: Marijuana      Comment: Quit, Smokes marijuana daily       Family History:  Family History   Problem Relation Age of Onset     Substance Abuse Father      Substance Abuse Paternal Grandmother      Substance Abuse Paternal Grandfather        Medications:  Current Outpatient Medications   Medication     albuterol (PROAIR HFA/PROVENTIL HFA/VENTOLIN HFA) 108 (90 Base) MCG/ACT inhaler     furosemide (LASIX) 20 MG tablet     hydrOXYzine (ATARAX) 25 MG tablet     loratadine (CLARITIN) 10 MG tablet     ondansetron (ZOFRAN) 4 MG tablet     albuterol (PROAIR HFA/PROVENTIL HFA/VENTOLIN HFA) 108 (90 Base) MCG/ACT inhaler     cephALEXin (KEFLEX) 500 MG capsule     ciprofloxacin (CIPRO) 500 MG tablet     HYDROcodone-acetaminophen (NORCO)  MG per tablet     ibuprofen (ADVIL/MOTRIN) 800 MG tablet     ondansetron (ZOFRAN-ODT) 4 MG ODT tab     Oxymetazoline HCl (NASAL SPRAY) 0.05 % SOLN     pantoprazole (PROTONIX) 40 MG EC tablet     spironolactone (ALDACTONE) 50 MG tablet     sulfamethoxazole-trimethoprim (BACTRIM DS) 800-160 MG tablet     No current facility-administered medications for this visit.       Review of Systems  A complete 10 point review of systems was asked and answered in the negative unless specifically commented upon in the HPI      Labs and Diagnostic tests:  Recent labs with sodium 141, creatinine 0.73  Total bili 2.2, INR 1.7    MELD-NA 15  Imaging:  US ABD 1/21/22:  IMPRESSION:     Coarsened liver parenchymal echogenicity compatible with cirrhosis. Minimal free fluid in the right upper quadrant.     Cholelithiasis    Procedures:  EGD 3/3/22:  Impression:        - Grade III esophageal varices with stigmata of recent bleeding.        Completely eradicated. Banded.        - Portal hypertensive gastropathy.        - A medium amount of food (residue) in the stomach.        - Gastroparesis.        - Normal examined duodenum.        - No specimens collected.       Assessment and Plan:  46 year old male with past medical history  of alcoholic cirrhosis complicated by ascites, hepatic encephalopathy and esophageal varices who was initially seen upon a virtual visit in December 2021 and is following up.    Alcoholic hepatitis and cirrhosis:    Patient had alcoholic hepatitis with his last alcohol drink in September 2021.  He is able to achieve 6 months of sobriety which would be his longest as he had multiple recurrences in the past, he had 4 failed treatments in the past and had done a court sanctioned inpatient treatment for 40 days and had multiple DUIs in the past.    His meld score is constantly decreasing and is now 15, hopefully it will continue to do decrease down with continuation of sobriety.  We will see him in 3 months as an in person visit and will decide depending upon his meld score at that point, he might not need liver transplantation.      Hepatocellular Cancer Screening:   - Recommend screening for HCC every 6 months with either abdominal ultrasound or by alternating abdominal ultrasound with EITHER a triple/quad phase CT Liver with IV contrast OR a Quad phase MRI Liver with IV contrast.  AFP levels should be checked every 6 months at time of imaging screen.    Ascites:  -Very minimal and no recent paracentesis, start taking diuretics daily  - Repeat blood work in 2 weeks to follow creatinine  - Continue to follow a sodium restricted (<2g sodium diet)     Hepatic Encephalopathy:  -Patient can be started on lactulose    Esophageal Varices:   - Recommend repeating an upper GI endoscopy as scheduled.  If evidence of medium/large esophageal varices, would recommend esophageal varix band ligation .  If band ligation is performed, please continue to repeat until eraditation of varices.    --Patient would need colonoscopy for colo-rectal cancer screening  -- Avoid NSAIDs      Follow Up:  - in 3 months    Pt was seen and discussed with Dr. Hendricks.  Thank you very much for the opportunity to participate in the care of this patient.   If you have any further questions, please don't hesitate to contact our office.    Chidi Roldan M.D.  Advanced & Transplant Hepatology Fellow  [facility]    Attestation:  This patient has been seen and evaluated by me, Dairo Hendricks.  Discussed with the house staff team or resident(s) and agree with the findings and plan in this note.

## 2022-04-27 NOTE — PROGRESS NOTES
US Abdomen order faxed to [facility] with instructions to call patient to schedule and number to fax results.    Marilee PRITCHARD LPN  Hepatology Clinic

## 2022-04-27 NOTE — PROGRESS NOTES
Andrei is a 46 year old who is being evaluated via a billable video visit.  .    How would you like to obtain your AVS? MyChart  If the video visit is dropped, the invitation should be resent by: Text to cell phone: [phone]  Will anyone else be joining your video visit? No      Video Start Time: 9:31 AM  Video-Visit Details    Type of service:  Video Visit    Video End Time:9:31 AM    Originating Location (pt. Location): Home    Distant Location (provider location):  [facility] [place] .    Platform used for Video Visit: Yanni Rodriguez MA on 4/27/2022 at 9:31 AM

## 2022-04-28 ENCOUNTER — TELEPHONE (OUTPATIENT)
Dept: GASTROENTEROLOGY | Facility: CLINIC | Age: 47
End: 2022-04-28
Payer: MEDICAID

## 2022-04-28 NOTE — TELEPHONE ENCOUNTER
Returned Padmini's call.  Let them know order is for abdomen complete.      Marilee PRITCHARD LPN  Hepatology Clinic       [facility]    Phone Message    May a detailed message be left on voicemail: yes     Reason for Call: Other: Padmini at [facility] called with a question on Dr. Hendricks's Ultrasound order for patient.  Patient does have an appt at 8:30am today, 4/28/22.  If she doesn't hear back, she will just do the whole abdomen/liver.  Writer tried to call [facility] but, no answer.       Action Taken: Message routed to:  [facility]    Travel Screening: Not Applicable

## 2022-05-07 DIAGNOSIS — K70.30 ALCOHOLIC CIRRHOSIS (H): Primary | ICD-10-CM

## 2022-06-26 NOTE — PROGRESS NOTES
MELD improving    Plan:  Follow up in person with Eladio July.  Determine if continued improvement vs. Proceed will transplant eval at that time    Orders for labs placed.  Pt still needs PEt local now.  
English

## 2022-06-30 ENCOUNTER — DOCUMENTATION ONLY (OUTPATIENT)
Dept: TRANSPLANT | Facility: CLINIC | Age: 47
End: 2022-06-30

## 2022-06-30 NOTE — PROGRESS NOTES
In reviewing chart, patient was scheduled to follow up with Dr. Hendricks at end of July after hopefully longer period of sobriety, determine if ready to proceed to full eval.  Unfortunately, patient had significant relapse of AUD, developed GI bleed, and passed away at outside hospital.      Referral closed, chart updated